# Patient Record
Sex: MALE
[De-identification: names, ages, dates, MRNs, and addresses within clinical notes are randomized per-mention and may not be internally consistent; named-entity substitution may affect disease eponyms.]

---

## 2023-02-08 PROBLEM — Z00.00 ENCOUNTER FOR PREVENTIVE HEALTH EXAMINATION: Status: ACTIVE | Noted: 2023-02-08

## 2023-02-09 ENCOUNTER — APPOINTMENT (OUTPATIENT)
Dept: UROLOGY | Facility: CLINIC | Age: 83
End: 2023-02-09
Payer: MEDICARE

## 2023-02-09 VITALS
DIASTOLIC BLOOD PRESSURE: 72 MMHG | HEART RATE: 78 BPM | BODY MASS INDEX: 18.36 KG/M2 | WEIGHT: 117 LBS | TEMPERATURE: 98 F | SYSTOLIC BLOOD PRESSURE: 126 MMHG | OXYGEN SATURATION: 98 % | HEIGHT: 67 IN | RESPIRATION RATE: 16 BRPM

## 2023-02-09 PROCEDURE — 51798 US URINE CAPACITY MEASURE: CPT

## 2023-02-09 PROCEDURE — 99203 OFFICE O/P NEW LOW 30 MIN: CPT

## 2023-02-10 NOTE — HISTORY OF PRESENT ILLNESS
[FreeTextEntry1] : Language: English\par Date of First visit: 02/09/2023 \par Accompanied by: self\par Contact info: \par Referring Provider/PCP: Dr. Jaden Gallegos\par Fax: \par \par \par \par CC/ Problem List:\par LUTS / BPH\par ===============================================================================\par FIRST VISIT / Summary:\par Very pleasant 83 yo M here for prostate symptoms. Currently on tamsulosin, nocturia x1. He saw Dr. Hdz 2 years ago and had cystoscopy and MRI scan (PIRADS 2). He states symptoms are stable without any changes.\par \par -------------------------------------------------------------------------------------------\par INTERVAL VISITS:\par \par ===============================================================================\par \par PMH: BPH\par Meds: tamsulosin\par All: nkda\par FHx: throat cancer (father - in 80s)\par SocHx: former smoker, about 10pack years, etoh 1/day, retired\par \par PSH: none\par \par ROS: Review of Systems is as per HPI unless otherwise denoted below\par \par \par ===============================================================================\par DATA: \par \par LABS (SELECTED):---------------------------------------------------------------------------------------------------\par \par \par RADS:-------------------------------------------------------------------------------------------------------------------\par 11/17/2020: MRI prostate PIRADS 2 with volume 48mL\par \par PATHOLOGY/CYTOLOGY:-------------------------------------------------------------------------------------------\par \par \par VOIDING STUDIES: ----------------------------------------------------------------------------------------------------\par 02/8/2023 PVR 35\par \par STONE STUDIES: (Analysis/LLSA)----------------------------------------------------------------------------------\par \par \par PROCEDURES: -----------------------------------------------------------------------------------------------\par \par \par \par \par ===============================================================================\par \par PHYSICAL EXAM:\par \par GEN: AAOx3, NAD\par \par PSYCH: Appropriate Behavior, Affect Congruent\par \par Lungs: No labored breathing\par \par GAIT: Gait normal, Stability good\par \par ABD: no suprapubic or CVAT\par \par \par  FOCUSED: --------------(done xx/xx/xxxx)------------------------------------------------------------------------\par declined\par \par =======================================================================================\par DISCUSSION: \par We discussed the rationale for no longer screening for prostate cancer at his age\par =======================================================================================\par ASSESSMENT and PLAN\par \par \par 1. BPH/LUTS\par - PVR low, continue tamsulosin\par - if symptos worsen, he would be a candidate for finasteride as well\par - follow-up in 1 year to check symptoms\par \par \par =======================================================================================\par \par Thank you for allowing me to assist in the care of your patient. Should you have any questions please do not hesitate to reach out to me.\par \par \par Sushant Vincent MD\par [facility]\par [facility] [place]\par [address], [address]\[place] [address]\par T: [phone]\par F: [phone]

## 2024-02-16 ENCOUNTER — APPOINTMENT (OUTPATIENT)
Dept: UROLOGY | Facility: CLINIC | Age: 84
End: 2024-02-16
Payer: MEDICARE

## 2024-02-16 VITALS
TEMPERATURE: 98 F | SYSTOLIC BLOOD PRESSURE: 143 MMHG | OXYGEN SATURATION: 100 % | WEIGHT: 117 LBS | HEIGHT: 67 IN | BODY MASS INDEX: 18.36 KG/M2 | HEART RATE: 67 BPM | RESPIRATION RATE: 16 BRPM | DIASTOLIC BLOOD PRESSURE: 77 MMHG

## 2024-02-16 PROCEDURE — 99213 OFFICE O/P EST LOW 20 MIN: CPT

## 2024-02-16 NOTE — HISTORY OF PRESENT ILLNESS
[FreeTextEntry1] : Language: English Date of First visit: 02/09/2023 Accompanied by: self Contact info: Referring Provider/PCP: Dr. Jaden Gallegos Fax: [phone]  CC/ Problem List:  LUTS / BPH  =============================================================================== FIRST VISIT / Summary: Very pleasant 81 yo M here for prostate symptoms. Currently on tamsulosin, nocturia x1. He saw Dr. Hdz 2 years ago and had cystoscopy and MRI scan (PIRADS 2). He states symptoms are stable without any changes.  ------------------------------------------------------------------------------------------- INTERVAL VISITS:  The patient's medications and allergies were reviewed and edited below. Dated 02/16/2024     ===============================================================================  PMH: BPH Meds: tamsulosin All: nkda FHx: throat cancer (father - in 80s) SocHx: former smoker, about 10pack years, etoh 1/day, retired  PSH: none  ROS: Review of Systems is as per HPI unless otherwise denoted below   =============================================================================== DATA: LABS (SELECTED):---------------------------------------------------------------------------------------------------   RADS:------------------------------------------------------------------------------------------------------------------- 11/17/2020: MRI prostate PIRADS 2 with volume 48mL  PATHOLOGY/CYTOLOGY:-------------------------------------------------------------------------------------------  VOIDING STUDIES: ---------------------------------------------------------------------------------------------------- 02/8/2023 PVR 35 02/16/2024 PVR 44  STONE STUDIES: (Analysis/LLSA)----------------------------------------------------------------------------------   PROCEDURES: -----------------------------------------------------------------------------------------------    ===============================================================================  PHYSICAL EXAM:   FOCUSED: --------------(done xx/xx/xxxx)------------------------------------------------------------------------ declined  ======================================================================================= DISCUSSION: We discussed the rationale for no longer screening for prostate cancer at his age  =======================================================================================  ASSESSMENT and PLAN  1. BPH/LUTS - PVR low, continue tamsulosin (getting from PCP) - if symptos worsen, he would be a candidate for finasteride as well - follow-up as needed if he develops symptoms   =======================================================================================  Thank you for allowing me to assist in the care of your patient. Should you have any questions please do not hesitate to reach out to me.   Sushant Vincent MD                                                             [facility]  [place] Office: [address], [address]    [place] [address]     T: [phone]     F: [phone]      [place] Office: [address] [place] [address], [address] [place] [address] T: [phone] F: [phone]

## 2024-06-25 DIAGNOSIS — R39.9 UNSPECIFIED SYMPTOMS AND SIGNS INVOLVING THE GENITOURINARY SYSTEM: ICD-10-CM

## 2024-06-25 RX ORDER — TAMSULOSIN HYDROCHLORIDE 0.4 MG/1
0.4 CAPSULE ORAL
Qty: 90 | Refills: 3 | Status: ACTIVE | COMMUNITY
Start: 2024-06-25 | End: 1900-01-01